# Patient Record
(demographics unavailable — no encounter records)

---

## 2025-01-21 NOTE — DISCUSSION/SUMMARY
[FreeTextEntry1] : -  Patients history and work up to date reviewed in detail with both the patient and her daughter. - Differential diagnosis of this complex pelvic mass discussed in detail including; benign, premalignant and malignant etiologies. The use of diagrams and drawings were used to help facilitate the conversation throughout. I recommended an MRI of the pelvis to review characterize the cyst as my clinical suspicion is that this may be a peritoneal inclusion cyst due to her complex surgical history versus a benign ovarian neoplasm given the simple appearing nature and relative stability in the size over time.  Given the weight loss and abdominal pain, I highly recommended she follow up for further work up with GI (i.e. colonoscopy) in light of diverticulosis seen on imaging and gastritis found on endoscopy. She reports follow up with GI and no issues to date.  -  She was recently seen by her GI provider who recommended indefinite PPI daily for her duodenal diverticulum and chronic gastritis. She was also recommended a bowel regimen for her chronic constipation and diverticulosis.  -  We reviewed her recent MRI in detail overall demonstrating a stable 8.6 x 6.7 cm unilocular simple right ovarian cyst. No septations, debris, or mural nodularity. Additional 0.7 cm unilocular right ovarian cyst with slightly thickened wall is also unchanged. We reviewed rationale for CA-125 and she would like to defer until after when she comes in again. We discussed the her workup to date in detail and shared decision to proceed with surveillance. For follow up with MRI in 6 months and CA-125 with exam.  - Mammogram ordered in accordance with HCM. - For follow up in approximately 6 months.  - I spent the time noted on the day of this patient encounter preparing for, providing and documenting the above service. I have counseled and educated the patient on the differential, workup, disease course, and treatment/management plan. Education was provided to the patient during this encounter. All questions and concerns were answered and addressed in detail.

## 2025-01-21 NOTE — HISTORY OF PRESENT ILLNESS
[FreeTextEntry1] : **Please note that this visit was converted to telemed due to technical difficulties** Permission was granted for this visit.  Ref:  [facility] ED PCP:  Dr. Ricardo Plascencia Cards:  Guru Taran MD Neuro:  Dano Delgadillo Hematology::  Dr. Jimi Cuello GI:  Babita Stewart MD  Ms. Enriquez, 89 years old, referred for pelvic mass.   Patient was recently seen and hospitalized at [facility] (admission  through 2024).  She presented for decreased appetite and weight loss.  Workup included imaging identifying a right adnexal cyst.  She was referred for further management.  Of note, patient did have an upper endoscopy while inpatient.  A gastric biopsy was done.    She will follow up with Dr. Stewart outpatient (has an apt for 9/3/24 at 12 noon).    Path:  1.  Gastric biopsy -   Gastric antral gland and junctional mucosa with inactive chronic gastritis and focal reactive/repair changes -   No morphological evidence of Helicobacter microorganisms -   Negative for intestinal metaplasia  Patient presents with her daughter, Glory, who stated patient does have a known ovarian cyst from prior imaging.  Per patient's daughter, 20 years ago, patient had bladder prolapse, treated with 2 pessaries which ruptured her vaginal wall, this was removed, healed and then proceeded to the OR for a partial hysterectomy and anchoring bladder to her fallopian tubes and umbilicus.  This hospitalization was complicated by several wound infections and admissions to the hospital for blood transfusions and IV antibiotics.  Additional symptoms include abdominal and pelvic sharp pain radiating at times to the left as well as vulvar pain. She also notes diffuse abdominal discomfort diffusely.  Denies f/c/n/v/d/vaginal bleeding, changes to bowel or bladder habits.  Patient's daughter says she also has a boil located in the perineal area.  Denies any other symptoms.   At her last visit 2024- She was recently seen by her GI provider who recommended indefinite PPI daily for her duodenal diverticulum and chronic gastritis. She was also recommended a bowel regimen for her chronic constipation and diverticulosis. Baseline labs demonstrated a normal CA-125. She has an MRI pelvis performed which we reviewed in detail (below).  We reviewed her clinical course in detail. Her recent MRI shows stability in the size of the cyst and no evidence of extraovarian disease. Her CA-125 drawn at her last visit was normal. She reports her pelvic pain to be on the left and not the right where the cyst is. She has had ongoing GI follow up. She has chronic constipation and I reviewed a bowel regimen to help her with this. I also informed her that I reviewed her CT scan with my colleague Dr. 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 We also reviewed the hernia that is presented likely related to her complex urogyn procedure in the past. We discussed management options in detail and my overall recommendation for surveillance to which she is in agreement.   Today she presents for her follow up visit. Please note that the visit was changed to vteb per the patients request given the cold weather at it causing joint pain. She denies fevers, chills, night sweats, chest pain, SOB, changes in appetite, nausea, vomiting, increased abdominal girth, unintentional weight loss, abdominopelvic pain, lower extremity edema or pain and changes in bowel/bladder movements. Denies vaginal bleeding and abnormal vaginal discharge.  We reviewed her recent MRI in detail overall demonstrating a stable 8.6 x 6.7 cm unilocular simple right ovarian cyst. No septations, debris, or mural nodularity. Additional 0.7 cm unilocular right ovarian cyst with slightly thickened wall is also unchanged. We reviewed rationale for CA-125 and she would like to defer until after when she comes in again. We discussed the her workup to date in detail and shared decision to proceed with surveillance. For follow up with MRI in 6 months and CA-125 with exam.    MRI pelvis (24):  MRI pelvis (9/15/24): FINDINGS: UTERUS: Hysterectomy  RIGHT OVARY: Parenchyma largely replaced by a 8.6 x 6.5 cm unilocular cystic lesion, previously 2.5 x 1.9 cm on MRI from 2014. Smooth inner wall. Composed of simple fluid. No solid tissue. Stable 7 mm unilocular cyst corresponding to a rim calcified nodule which dates back to 2012.  LEFT OVARY: Within normal limits. ADNEXA: Within normal limits.  BLADDER: Underdistended, unremarkable as visualized.  LYMPH NODES: No pelvic lymphadenopathy.  VISUALIZED PORTIONS:  ABDOMINAL ORGANS: Status post cholecystectomy with nonspecific dilatation of the common bile duct. Subcentimeter left renal cyst. BOWEL: Extensive diverticulosis of the descending and sigmoid colon. PERITONEUM: No ascites. VESSELS: Within normal limits. ABDOMINAL WALL: Pelvic floor laxity and right-sided perineal hernia containing a small portion of the distal rectum. BONES: Degenerative changes.  IMPRESSION: 8.6 x 6.5 cm unilocular right ovarian lesion, significantly increased since 2014. O-RADS MRI 2*    8/3/24 GYN Inpatient note: *Most of history obtained from daughter Glory* HPI: 90yo , postmenopausal who presents with abd pain and decreased appetite. Daughter reports tht pt developed decreased mary x2 weeks with lower abd pain x2 days. Abd pain worsened yesterday and pt developed dizziness so was brought to hospital. Daughter notes weight loss over 3mo. Denies VB, abnl vaginal discharge, dysuria, fevers, chills, chest pain, SOB. Daughter notes pt has had R ovarian cyst since . Daughter brought records. MRI () shows 7x6.5x5.4cm right ovarian lesion mostly fluid signal intensity. Pelvic sono 2024 shows R ovary 6.8x7.1x7cm adnexal complex cyst.  Upon chart review, R ovarian cyst first noted in  and was 1.6cm. Again noted in , 2.4cm. Name of Ob/Gyn Physician: Pt cannot remember OBHx: 1TOP - can't remember d&c or med  x5 GynHx: partial hysterectomy with Dr. Cristiane Mccracken in [facility] (20yrs ago) for uterovaginal prolapse - c/b 3 post-op infections PMHx: HTN, HLD, asthma, CKD, RA PSHx: L carotid endarterectomy, lsc quoc, partial hyst  Meds: Labetalol, Hydralazine, Furosemide, VitD, Ca, B12, Rosuvastatin, Amlodipine, Famotidine, Montelukast, Brimonidine, Donepezil All: Percocet (itching), PCN (rash)  24 [facility] ED HPI: HPI Objective Statement: 89-year-old female presenting with 1 week of decreased PO intake, headache, abdominal pain, and bloating.  Patient's daughter states that she has been weaker than usual in the past week and was concerned because of low pulse ox reading at home this morning, 87% on room air but without increased work of breathing. Last BM yesterday, normal consistency, no blood. No fevers, chest pain, shortness of breath, cough, N/V/D, dysuria, blood in urine or stool. Patient has known ovarian cyst seen on previous imaging.  Imagin24 TVUS ([facility]) Uterus: Not visualized, and noted to be status post hysterectomy on the prior CT. Right ovary/adnexa: Right adnexal cystic lesion measuring 6.8 x 8.3 x 6.1 cm, which appears grossly simple without definite solid component appreciated on transabdominal ultrasound. However, inferior portions of the lesion are obscured by shadowing from the pubic symphysis. Left ovary/adnexa: Left ovary was not visualized. Fluid: None. IMPRESSION: * Limited transabdominal pelvic sonogram. * Limited visualization of a right adnexal cystic lesion, which measures 8.3 cm. Consider evaluation with contrast-enhanced pelvic MRI. Recommend gynecological follow-up. * Nonvisualization of the left ovary. * Nonvisualization of the uterus, in keeping with history of hysterectomy.  24 CT A/P ([facility]) LOWER CHEST: Cardiomegaly. Trace pericardial effusion. Small hiatal hernia. Elevation of the right hemidiaphragm. LIVER: Within normal limits. BILE DUCTS: Mild CBD dilatation, nonspecific in the setting of prior  cholecystectomy. GALLBLADDER: Cholecystectomy. SPLEEN: Rounded isodense contour abnormality of the upper spleen measures  5.3 cm, previously characterized as hamartoma. PANCREAS: Within normal limits. ADRENALS: Within normal limits. KIDNEYS/URETERS: Symmetric renal enhancement. No hydronephrosis. Left  subcentimeter hypodensities too small to characterize. BLADDER: Within normal limits. REPRODUCTIVE ORGANS: Hysterectomy. BOWEL: Circumferential thickening of the proximal duodenum. No bowel  obstruction. Appendix is normal. Colonic diverticulosis without  diverticulitis. Right-sided posterior perineal hernia containing a small  portion of distal rectum extending into the right ischioanal fossa. PERITONEUM/RETROPERITONEUM: 6.1 x 8.6 x 7.2 cm right adnexal cystic  lesion with a 1 cm calcification in the periphery, increased in size from  2.4 cm in 2014 (301, 80). VESSELS: Atherosclerotic changes. LYMPH NODES: No lymphadenopathy. ABDOMINAL WALL: Postsurgical changes. BONES: Grade 1 anterolisthesis of L3 on L4. A T8 vertebral body  hemangioma. Mild degenerative change of the spine. IMPRESSION: Circumferential wall thickening of the proximal duodenum for which upper  endoscopy is recommended to exclude underlying mass. An 8.6 cm cystic right adnexal mass, markedly increased from prior study.  GYN consult is recommended. Right-sided posterior perineal hernia containing small portion of the  distal rectum in the ischioanal fossa.  Labs: 2024:  = 10; CA 19-9 = 16; CEA = 3.1 ([facility]) 2022: CA 19-9 = 18; CEA = 3.6 ([facility])  POB: LMP age 49 PGYN: G5, P5 (5 vaginal deliveries, 3 children living, ages 70, 66 and 53).  When asked reasons for other 2 children's demise, patient and her daughter did not want to discuss. PMH: Hypertension, hyperlipidemia, asthma (as a child), chronic kidney disease, rheumatoid arthritis Meds: Labetalol, furosemide, vitamin D3, calcium, Centrum Silver, vitamin B12, rosuvastatin, amlodipine, montelukast, Brimonidine, donezepil, pantoprazole, thiamine, albuterol as needed Allergies: Penicillin (rash), Percocet (itching), cumin, avocado, seasoning such as Mrs. Vincent (swollen lips) PSH: Left carotid endarterectomy at [facility] ; lap quoc at [facility] 2016, 2004 bladder prolapse repair/partial hysterectomy. Social Hx: Lives with her daughter, Glory, non-smoker, no alcohol, no marijuana, no other drugs FH: Father-prostate cancer-age 82, 6 of patient's brothers had prostate cancer in their mid 40s.  Health Maintenance: TTE:  24 EF:  73% Mammogram: In her 70s Colonoscopy: In her 70s DEXA: PAP: In her 70s

## 2025-01-21 NOTE — HISTORY OF PRESENT ILLNESS
[FreeTextEntry1] : **Please note that this visit was converted to telemed due to technical difficulties** Permission was granted for this visit.  Ref:  [facility] ED PCP:  Dr. Ricardo Plascencia Cards:  Guru Taran MD Neuro:  Dano Delgadillo Hematology::  Dr. Jimi Cuello GI:  Babita Stewart MD  Ms. Enriquez, 89 years old, referred for pelvic mass.   Patient was recently seen and hospitalized at [facility] (admission  through 2024).  She presented for decreased appetite and weight loss.  Workup included imaging identifying a right adnexal cyst.  She was referred for further management.  Of note, patient did have an upper endoscopy while inpatient.  A gastric biopsy was done.    She will follow up with Dr. Stewart outpatient (has an apt for 9/3/24 at 12 noon).    Path:  1.  Gastric biopsy -   Gastric antral gland and junctional mucosa with inactive chronic gastritis and focal reactive/repair changes -   No morphological evidence of Helicobacter microorganisms -   Negative for intestinal metaplasia  Patient presents with her daughter, Glory, who stated patient does have a known ovarian cyst from prior imaging.  Per patient's daughter, 20 years ago, patient had bladder prolapse, treated with 2 pessaries which ruptured her vaginal wall, this was removed, healed and then proceeded to the OR for a partial hysterectomy and anchoring bladder to her fallopian tubes and umbilicus.  This hospitalization was complicated by several wound infections and admissions to the hospital for blood transfusions and IV antibiotics.  Additional symptoms include abdominal and pelvic sharp pain radiating at times to the left as well as vulvar pain. She also notes diffuse abdominal discomfort diffusely.  Denies f/c/n/v/d/vaginal bleeding, changes to bowel or bladder habits.  Patient's daughter says she also has a boil located in the perineal area.  Denies any other symptoms.   At her last visit 2024- She was recently seen by her GI provider who recommended indefinite PPI daily for her duodenal diverticulum and chronic gastritis. She was also recommended a bowel regimen for her chronic constipation and diverticulosis. Baseline labs demonstrated a normal CA-125. She has an MRI pelvis performed which we reviewed in detail (below).  We reviewed her clinical course in detail. Her recent MRI shows stability in the size of the cyst and no evidence of extraovarian disease. Her CA-125 drawn at her last visit was normal. She reports her pelvic pain to be on the left and not the right where the cyst is. She has had ongoing GI follow up. She has chronic constipation and I reviewed a bowel regimen to help her with this. I also informed her that I reviewed her CT scan with my colleague Dr. 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 We also reviewed the hernia that is presented likely related to her complex urogyn procedure in the past. We discussed management options in detail and my overall recommendation for surveillance to which she is in agreement.   Today she presents for her follow up visit. Please note that the visit was changed to vteb per the patients request given the cold weather at it causing joint pain. She denies fevers, chills, night sweats, chest pain, SOB, changes in appetite, nausea, vomiting, increased abdominal girth, unintentional weight loss, abdominopelvic pain, lower extremity edema or pain and changes in bowel/bladder movements. Denies vaginal bleeding and abnormal vaginal discharge.  We reviewed her recent MRI in detail overall demonstrating a stable 8.6 x 6.7 cm unilocular simple right ovarian cyst. No septations, debris, or mural nodularity. Additional 0.7 cm unilocular right ovarian cyst with slightly thickened wall is also unchanged. We reviewed rationale for CA-125 and she would like to defer until after when she comes in again. We discussed the her workup to date in detail and shared decision to proceed with surveillance. For follow up with MRI in 6 months and CA-125 with exam.    MRI pelvis (24):  MRI pelvis (9/15/24): FINDINGS: UTERUS: Hysterectomy  RIGHT OVARY: Parenchyma largely replaced by a 8.6 x 6.5 cm unilocular cystic lesion, previously 2.5 x 1.9 cm on MRI from 2014. Smooth inner wall. Composed of simple fluid. No solid tissue. Stable 7 mm unilocular cyst corresponding to a rim calcified nodule which dates back to 2012.  LEFT OVARY: Within normal limits. ADNEXA: Within normal limits.  BLADDER: Underdistended, unremarkable as visualized.  LYMPH NODES: No pelvic lymphadenopathy.  VISUALIZED PORTIONS:  ABDOMINAL ORGANS: Status post cholecystectomy with nonspecific dilatation of the common bile duct. Subcentimeter left renal cyst. BOWEL: Extensive diverticulosis of the descending and sigmoid colon. PERITONEUM: No ascites. VESSELS: Within normal limits. ABDOMINAL WALL: Pelvic floor laxity and right-sided perineal hernia containing a small portion of the distal rectum. BONES: Degenerative changes.  IMPRESSION: 8.6 x 6.5 cm unilocular right ovarian lesion, significantly increased since 2014. O-RADS MRI 2*    8/3/24 GYN Inpatient note: *Most of history obtained from daughter Glory* HPI: 90yo , postmenopausal who presents with abd pain and decreased appetite. Daughter reports tht pt developed decreased mary x2 weeks with lower abd pain x2 days. Abd pain worsened yesterday and pt developed dizziness so was brought to hospital. Daughter notes weight loss over 3mo. Denies VB, abnl vaginal discharge, dysuria, fevers, chills, chest pain, SOB. Daughter notes pt has had R ovarian cyst since . Daughter brought records. MRI () shows 7x6.5x5.4cm right ovarian lesion mostly fluid signal intensity. Pelvic sono 2024 shows R ovary 6.8x7.1x7cm adnexal complex cyst.  Upon chart review, R ovarian cyst first noted in  and was 1.6cm. Again noted in , 2.4cm. Name of Ob/Gyn Physician: Pt cannot remember OBHx: 1TOP - can't remember d&c or med  x5 GynHx: partial hysterectomy with Dr. Cristiane Mccracken in [facility] (20yrs ago) for uterovaginal prolapse - c/b 3 post-op infections PMHx: HTN, HLD, asthma, CKD, RA PSHx: L carotid endarterectomy, lsc quoc, partial hyst  Meds: Labetalol, Hydralazine, Furosemide, VitD, Ca, B12, Rosuvastatin, Amlodipine, Famotidine, Montelukast, Brimonidine, Donepezil All: Percocet (itching), PCN (rash)  24 [facility] ED HPI: HPI Objective Statement: 89-year-old female presenting with 1 week of decreased PO intake, headache, abdominal pain, and bloating.  Patient's daughter states that she has been weaker than usual in the past week and was concerned because of low pulse ox reading at home this morning, 87% on room air but without increased work of breathing. Last BM yesterday, normal consistency, no blood. No fevers, chest pain, shortness of breath, cough, N/V/D, dysuria, blood in urine or stool. Patient has known ovarian cyst seen on previous imaging.  Imagin24 TVUS ([facility]) Uterus: Not visualized, and noted to be status post hysterectomy on the prior CT. Right ovary/adnexa: Right adnexal cystic lesion measuring 6.8 x 8.3 x 6.1 cm, which appears grossly simple without definite solid component appreciated on transabdominal ultrasound. However, inferior portions of the lesion are obscured by shadowing from the pubic symphysis. Left ovary/adnexa: Left ovary was not visualized. Fluid: None. IMPRESSION: * Limited transabdominal pelvic sonogram. * Limited visualization of a right adnexal cystic lesion, which measures 8.3 cm. Consider evaluation with contrast-enhanced pelvic MRI. Recommend gynecological follow-up. * Nonvisualization of the left ovary. * Nonvisualization of the uterus, in keeping with history of hysterectomy.  24 CT A/P ([facility]) LOWER CHEST: Cardiomegaly. Trace pericardial effusion. Small hiatal hernia. Elevation of the right hemidiaphragm. LIVER: Within normal limits. BILE DUCTS: Mild CBD dilatation, nonspecific in the setting of prior  cholecystectomy. GALLBLADDER: Cholecystectomy. SPLEEN: Rounded isodense contour abnormality of the upper spleen measures  5.3 cm, previously characterized as hamartoma. PANCREAS: Within normal limits. ADRENALS: Within normal limits. KIDNEYS/URETERS: Symmetric renal enhancement. No hydronephrosis. Left  subcentimeter hypodensities too small to characterize. BLADDER: Within normal limits. REPRODUCTIVE ORGANS: Hysterectomy. BOWEL: Circumferential thickening of the proximal duodenum. No bowel  obstruction. Appendix is normal. Colonic diverticulosis without  diverticulitis. Right-sided posterior perineal hernia containing a small  portion of distal rectum extending into the right ischioanal fossa. PERITONEUM/RETROPERITONEUM: 6.1 x 8.6 x 7.2 cm right adnexal cystic  lesion with a 1 cm calcification in the periphery, increased in size from  2.4 cm in 2014 (301, 80). VESSELS: Atherosclerotic changes. LYMPH NODES: No lymphadenopathy. ABDOMINAL WALL: Postsurgical changes. BONES: Grade 1 anterolisthesis of L3 on L4. A T8 vertebral body  hemangioma. Mild degenerative change of the spine. IMPRESSION: Circumferential wall thickening of the proximal duodenum for which upper  endoscopy is recommended to exclude underlying mass. An 8.6 cm cystic right adnexal mass, markedly increased from prior study.  GYN consult is recommended. Right-sided posterior perineal hernia containing small portion of the  distal rectum in the ischioanal fossa.  Labs: 2024:  = 10; CA 19-9 = 16; CEA = 3.1 ([facility]) 2022: CA 19-9 = 18; CEA = 3.6 ([facility])  POB: LMP age 49 PGYN: G5, P5 (5 vaginal deliveries, 3 children living, ages 70, 66 and 53).  When asked reasons for other 2 children's demise, patient and her daughter did not want to discuss. PMH: Hypertension, hyperlipidemia, asthma (as a child), chronic kidney disease, rheumatoid arthritis Meds: Labetalol, furosemide, vitamin D3, calcium, Centrum Silver, vitamin B12, rosuvastatin, amlodipine, montelukast, Brimonidine, donezepil, pantoprazole, thiamine, albuterol as needed Allergies: Penicillin (rash), Percocet (itching), cumin, avocado, seasoning such as Mrs. Vincent (swollen lips) PSH: Left carotid endarterectomy at [facility] ; lap qouc at [facility] 2016, 2004 bladder prolapse repair/partial hysterectomy. Social Hx: Lives with her daughter, Glory, non-smoker, no alcohol, no marijuana, no other drugs FH: Father-prostate cancer-age 82, 6 of patient's brothers had prostate cancer in their mid 40s.  Health Maintenance: TTE:  24 EF:  73% Mammogram: In her 70s Colonoscopy: In her 70s DEXA: PAP: In her 70s

## 2025-01-21 NOTE — ASSESSMENT
[FreeTextEntry1] : 90 yo with recent admission for decreased appetite and weight loss with known pelvic cyst with history of pelvic organ prolapse surgery (?AUDIE with bladder suspension to umbilicus and ? failopian tubes) here for follow up consultation.